# Patient Record
Sex: FEMALE | Race: WHITE | NOT HISPANIC OR LATINO | URBAN - METROPOLITAN AREA
[De-identification: names, ages, dates, MRNs, and addresses within clinical notes are randomized per-mention and may not be internally consistent; named-entity substitution may affect disease eponyms.]

---

## 2020-07-25 ENCOUNTER — TELEPHONE ENCOUNTER (OUTPATIENT)
Dept: URBAN - METROPOLITAN AREA CLINIC 13 | Facility: CLINIC | Age: 30
End: 2020-07-25

## 2020-07-25 RX ORDER — NORETHINDRONE ACETATE AND ETHINYL ESTRADIOL 1; 20 MG/1; UG/1
TAKE AS DIRECTED TABLET ORAL
Qty: 21 | Refills: 0 | OUTPATIENT
Start: 2012-11-14 | End: 2017-04-05

## 2020-07-25 RX ORDER — HYDROCHLOROTHIAZIDE 25 MG/1
TAKE 1 TABLET DAILY TABLET ORAL
Qty: 30 | Refills: 0 | OUTPATIENT
Start: 2012-12-31 | End: 2017-04-05

## 2020-07-25 RX ORDER — HYDROCORTISONE ACETATE 25 MG/1
INSERT 1 SUPP BEDTIME SUPPOSITORY RECTAL
Qty: 1 | Refills: 1 | OUTPATIENT
Start: 2013-02-11 | End: 2017-04-05

## 2020-07-25 RX ORDER — ARIPIPRAZOLE 10 MG/1
TAKE 1/2 TABLET DAILY TABLET ORAL
Qty: 30 | Refills: 0 | OUTPATIENT
Start: 2013-01-13 | End: 2017-04-05

## 2020-07-26 ENCOUNTER — TELEPHONE ENCOUNTER (OUTPATIENT)
Dept: URBAN - METROPOLITAN AREA CLINIC 13 | Facility: CLINIC | Age: 30
End: 2020-07-26

## 2020-07-26 RX ORDER — ETONOGESTREL AND ETHINYL ESTRADIOL .12; .015 MG/D; MG/D
INSERT, EXTENDED RELEASE VAGINAL
Qty: 1 | Refills: 0 | Status: ACTIVE | COMMUNITY
Start: 2011-04-10

## 2020-07-26 RX ORDER — PHENAZOPYRIDINE HYDROCHLORIDE 100 MG/1
TAKE 1 CAPSULE DAILY TABLET, COATED ORAL
Refills: 0 | Status: ACTIVE | COMMUNITY

## 2020-07-26 RX ORDER — AZITHROMYCIN DIHYDRATE 250 MG/1
TAKE AS DIRECTED TABLET, FILM COATED ORAL
Qty: 6 | Refills: 0 | Status: ACTIVE | COMMUNITY
Start: 2011-10-26

## 2020-07-26 RX ORDER — HYDROCORTISONE ACETATE 25 MG/1
INSERT 1 SUPPOSITORY RECTALLY AT BEDTIME AS NEEDED SUPPOSITORY RECTAL
Qty: 1 | Refills: 1 | Status: ACTIVE | COMMUNITY
Start: 2017-05-23

## 2020-07-26 RX ORDER — ETONOGESTREL AND ETHINYL ESTRADIOL .12; .015 MG/D; MG/D
USE UTD INSERT, EXTENDED RELEASE VAGINAL
Qty: 1 | Refills: 0 | Status: ACTIVE | COMMUNITY
Start: 2011-12-12

## 2020-07-26 RX ORDER — ETONOGESTREL AND ETHINYL ESTRADIOL .12; .015 MG/D; MG/D
INSERT 1 RING VAGINALLY FOR 3 WEEKS THEN 1 WEEK OFF INSERT, EXTENDED RELEASE VAGINAL
Refills: 0 | Status: ACTIVE | COMMUNITY

## 2020-07-26 RX ORDER — BUPROPION HYDROCHLORIDE 150 MG/1
TABLET, FILM COATED, EXTENDED RELEASE ORAL
Qty: 30 | Refills: 0 | Status: ACTIVE | COMMUNITY
Start: 2012-11-06

## 2021-08-24 ENCOUNTER — OFFICE VISIT (OUTPATIENT)
Dept: URBAN - METROPOLITAN AREA CLINIC 113 | Facility: CLINIC | Age: 31
End: 2021-08-24
Payer: COMMERCIAL

## 2021-08-24 VITALS
TEMPERATURE: 97.7 F | DIASTOLIC BLOOD PRESSURE: 73 MMHG | HEIGHT: 63 IN | HEART RATE: 62 BPM | BODY MASS INDEX: 26.58 KG/M2 | RESPIRATION RATE: 18 BRPM | WEIGHT: 150 LBS | SYSTOLIC BLOOD PRESSURE: 105 MMHG

## 2021-08-24 DIAGNOSIS — Z86.010 HISTORY OF ADENOMATOUS POLYP OF COLON: ICD-10-CM

## 2021-08-24 DIAGNOSIS — R12 HEARTBURN: ICD-10-CM

## 2021-08-24 DIAGNOSIS — R14.0 ABDOMINAL BLOATING: ICD-10-CM

## 2021-08-24 DIAGNOSIS — R10.13 EPIGASTRIC ABDOMINAL PAIN: ICD-10-CM

## 2021-08-24 PROCEDURE — 99204 OFFICE O/P NEW MOD 45 MIN: CPT | Performed by: INTERNAL MEDICINE

## 2021-08-24 RX ORDER — VALACYCLOVIR HYDROCHLORIDE 500 MG/1
1 TABLET TABLET, FILM COATED ORAL ONCE A DAY
Status: ACTIVE | COMMUNITY

## 2021-08-24 RX ORDER — BUPROPION HYDROCHLORIDE 150 MG/1
TABLET, FILM COATED, EXTENDED RELEASE ORAL
Qty: 30 | Refills: 0 | Status: DISCONTINUED | COMMUNITY
Start: 2012-11-06

## 2021-08-24 RX ORDER — B-COMPLEX WITH VITAMIN C
1 TABLET TABLET ORAL ONCE A DAY
Status: ACTIVE | COMMUNITY

## 2021-08-24 RX ORDER — ETONOGESTREL AND ETHINYL ESTRADIOL .12; .015 MG/D; MG/D
_INSERT 1 RING VAGINALLY FOR 3 WEEKS THEN 1 WEEK OFF INSERT, EXTENDED RELEASE VAGINAL
Refills: 0 | Status: ACTIVE | COMMUNITY

## 2021-08-24 RX ORDER — MULTIVIT-MIN/IRON/FOLIC ACID/K 18-600-40
AS DIRECTED CAPSULE ORAL
Status: ACTIVE | COMMUNITY

## 2021-08-24 RX ORDER — MULTIVIT-MIN/IRON/FOLIC ACID/K 18-600-40
1 CAPSULE CAPSULE ORAL ONCE A DAY
Status: ACTIVE | COMMUNITY

## 2021-08-24 RX ORDER — AZITHROMYCIN DIHYDRATE 250 MG/1
TAKE AS DIRECTED TABLET, FILM COATED ORAL
Qty: 6 | Refills: 0 | Status: DISCONTINUED | COMMUNITY
Start: 2011-10-26

## 2021-08-24 RX ORDER — SODIUM, POTASSIUM,MAG SULFATES 17.5-3.13G
354 ML SOLUTION, RECONSTITUTED, ORAL ORAL ONCE
Qty: 354 MILLILITER | Refills: 0 | OUTPATIENT
Start: 2021-08-24 | End: 2021-08-25

## 2021-08-24 RX ORDER — CLONAZEPAM 1 MG/1
1 TABLET TABLET ORAL ONCE A DAY
Status: ACTIVE | COMMUNITY

## 2021-08-24 RX ORDER — PHENAZOPYRIDINE HYDROCHLORIDE 100 MG/1
TAKE 1 CAPSULE DAILY TABLET, COATED ORAL
Refills: 0 | Status: DISCONTINUED | COMMUNITY

## 2021-08-24 RX ORDER — HYDROCORTISONE ACETATE 25 MG/1
_INSERT 1 SUPPOSITORY RECTALLY AT BEDTIME AS NEEDED SUPPOSITORY RECTAL
Qty: 1 | Refills: 1 | Status: DISCONTINUED | COMMUNITY
Start: 2017-05-23

## 2021-08-24 NOTE — HPI-TODAY'S VISIT:
31-year-old female with a history of complicated external and internal hemorrhoids, serrated adenomas of the colon due surveillance, presenting for evaluation of abdominal bloating.  She reports a 3-6 month history of persistent abdominal bloating, with associated heartburn, indigestion and belching. The symptoms are typically present upon waking, but are worsened after eating- particularly with consumption of dairy products or spicy foods. She has occasional epigastric pain associated with meals, but this is infrequent. She denies nausea or vomiting. She has 1-2 nonbloody stools daily. She has tried omeprazole, digestive enzymes, and probiotics without change. She takes Aleve a few times per month.

## 2021-08-25 LAB — H PYLORI BREATH TEST: NEGATIVE

## 2021-09-16 ENCOUNTER — TELEPHONE ENCOUNTER (OUTPATIENT)
Dept: URBAN - METROPOLITAN AREA CLINIC 113 | Facility: CLINIC | Age: 31
End: 2021-09-16

## 2021-09-17 ENCOUNTER — LAB OUTSIDE AN ENCOUNTER (OUTPATIENT)
Dept: URBAN - METROPOLITAN AREA CLINIC 113 | Facility: CLINIC | Age: 31
End: 2021-09-17

## 2021-09-17 ENCOUNTER — OFFICE VISIT (OUTPATIENT)
Dept: URBAN - METROPOLITAN AREA SURGERY CENTER 25 | Facility: SURGERY CENTER | Age: 31
End: 2021-09-17
Payer: COMMERCIAL

## 2021-09-17 DIAGNOSIS — D12.3 ADENOMA OF TRANSVERSE COLON: ICD-10-CM

## 2021-09-17 DIAGNOSIS — Z86.010 ADENOMAS PERSONAL HISTORY OF COLONIC POLYPS: ICD-10-CM

## 2021-09-17 DIAGNOSIS — D12.0 ADENOMA OF CECUM: ICD-10-CM

## 2021-09-17 PROCEDURE — G8907 PT DOC NO EVENTS ON DISCHARG: HCPCS | Performed by: INTERNAL MEDICINE

## 2021-09-17 PROCEDURE — 45385 COLONOSCOPY W/LESION REMOVAL: CPT | Performed by: INTERNAL MEDICINE

## 2021-09-17 RX ORDER — MULTIVIT-MIN/IRON/FOLIC ACID/K 18-600-40
AS DIRECTED CAPSULE ORAL
Status: ACTIVE | COMMUNITY

## 2021-09-17 RX ORDER — CLONAZEPAM 1 MG/1
1 TABLET TABLET ORAL ONCE A DAY
Status: ACTIVE | COMMUNITY

## 2021-09-17 RX ORDER — B-COMPLEX WITH VITAMIN C
1 TABLET TABLET ORAL ONCE A DAY
Status: ACTIVE | COMMUNITY

## 2021-09-17 RX ORDER — MULTIVIT-MIN/IRON/FOLIC ACID/K 18-600-40
1 CAPSULE CAPSULE ORAL ONCE A DAY
Status: ACTIVE | COMMUNITY

## 2021-09-17 RX ORDER — ETONOGESTREL AND ETHINYL ESTRADIOL .12; .015 MG/D; MG/D
_INSERT 1 RING VAGINALLY FOR 3 WEEKS THEN 1 WEEK OFF INSERT, EXTENDED RELEASE VAGINAL
Refills: 0 | Status: ACTIVE | COMMUNITY

## 2021-09-17 RX ORDER — VALACYCLOVIR HYDROCHLORIDE 500 MG/1
1 TABLET TABLET, FILM COATED ORAL ONCE A DAY
Status: ACTIVE | COMMUNITY

## 2021-09-26 LAB
COMMENT:: (no result)
CPT CODE(S):: (no result)
CPT DISCLAIMER:: (no result)
DIAGNOSIS SYNOPSIS:: (no result)
DIAGNOSIS:: (no result)
ENDOSCOPIC FINDINGS:: (no result)
Lab: (no result)
MICROSCOPIC DESCRIPTION:: (no result)
PATHOLOGIST PROVIDED ICD:: (no result)
SPECIMEN:: (no result)

## 2021-10-01 ENCOUNTER — OFFICE VISIT (OUTPATIENT)
Dept: URBAN - METROPOLITAN AREA CLINIC 113 | Facility: CLINIC | Age: 31
End: 2021-10-01

## 2021-10-08 ENCOUNTER — OFFICE VISIT (OUTPATIENT)
Dept: URBAN - METROPOLITAN AREA CLINIC 113 | Facility: CLINIC | Age: 31
End: 2021-10-08

## 2021-11-05 ENCOUNTER — OFFICE VISIT (OUTPATIENT)
Dept: URBAN - METROPOLITAN AREA CLINIC 107 | Facility: CLINIC | Age: 31
End: 2021-11-05

## 2021-11-05 RX ORDER — CLONAZEPAM 1 MG/1
1 TABLET TABLET ORAL ONCE A DAY
Status: ACTIVE | COMMUNITY

## 2021-11-05 RX ORDER — MULTIVIT-MIN/IRON/FOLIC ACID/K 18-600-40
1 CAPSULE CAPSULE ORAL ONCE A DAY
Status: ACTIVE | COMMUNITY

## 2021-11-05 RX ORDER — ETONOGESTREL AND ETHINYL ESTRADIOL .12; .015 MG/D; MG/D
_INSERT 1 RING VAGINALLY FOR 3 WEEKS THEN 1 WEEK OFF INSERT, EXTENDED RELEASE VAGINAL
Refills: 0 | Status: ACTIVE | COMMUNITY

## 2021-11-05 RX ORDER — MULTIVIT-MIN/IRON/FOLIC ACID/K 18-600-40
AS DIRECTED CAPSULE ORAL
Status: ACTIVE | COMMUNITY

## 2021-11-05 RX ORDER — VALACYCLOVIR HYDROCHLORIDE 500 MG/1
1 TABLET TABLET, FILM COATED ORAL ONCE A DAY
Status: ACTIVE | COMMUNITY

## 2021-11-05 RX ORDER — B-COMPLEX WITH VITAMIN C
1 TABLET TABLET ORAL ONCE A DAY
Status: ACTIVE | COMMUNITY

## 2021-11-05 NOTE — HPI-TODAY'S VISIT:
Ms. Brown is a 31-year-old woman with a history of complicated external and internal hemorrhoids, serrated adenomas of the colon, and anxiety, presenting for follow-up after undergoing colonoscopy. She was last seen on 8/24/2021, referred by Dr. Thrasher, for evaluation regarding excessive gas and bloating.  H. pylori breath test and abdominal ultrasound were ordered at that time as she requested conservative management.  Upper endoscopy was deferred at that time.  She was planned for surveillance colonoscopy given her history of serrated adenomas.  She was to continue FD guard, digestive enzymes and daily probiotic.  Education given regarding low FODMAP diet. Labs (8/24/2021): Negative H. pylori breath test Abdominal ultrasound (8/27/2021): Normal sonographic evaluation of the abdomen. Colonoscopy (9/17/2021): [place] bowel preparation scale score of 9. One 15 mm polyp in the cecum. One 9 mm polyp in the distal transverse colon.  Nonbleeding internal hemorrhoids, grade 1 through 2.  Cecal and transverse colon polypectomies demonstrated sessile serrated adenomas.  Repeat colonoscopy is recommended in 3 years as part of colon cancer prevention.

## 2021-12-03 ENCOUNTER — TELEPHONE ENCOUNTER (OUTPATIENT)
Dept: URBAN - METROPOLITAN AREA CLINIC 113 | Facility: CLINIC | Age: 31
End: 2021-12-03

## 2021-12-03 RX ORDER — HYOSCYAMINE SULFATE 0.12 MG/1
1 TABLET AS NEEDED TABLET ORAL
Qty: 90 | Refills: 2 | OUTPATIENT

## 2021-12-09 ENCOUNTER — OFFICE VISIT (OUTPATIENT)
Dept: URBAN - METROPOLITAN AREA CLINIC 107 | Facility: CLINIC | Age: 31
End: 2021-12-09

## 2022-01-12 ENCOUNTER — OFFICE VISIT (OUTPATIENT)
Dept: URBAN - METROPOLITAN AREA CLINIC 113 | Facility: CLINIC | Age: 32
End: 2022-01-12

## 2022-01-27 ENCOUNTER — OFFICE VISIT (OUTPATIENT)
Dept: URBAN - METROPOLITAN AREA CLINIC 107 | Facility: CLINIC | Age: 32
End: 2022-01-27

## 2022-02-03 ENCOUNTER — OFFICE VISIT (OUTPATIENT)
Dept: URBAN - METROPOLITAN AREA CLINIC 107 | Facility: CLINIC | Age: 32
End: 2022-02-03
Payer: COMMERCIAL

## 2022-02-03 ENCOUNTER — WEB ENCOUNTER (OUTPATIENT)
Dept: URBAN - METROPOLITAN AREA CLINIC 107 | Facility: CLINIC | Age: 32
End: 2022-02-03

## 2022-02-03 VITALS
HEART RATE: 84 BPM | HEIGHT: 63 IN | RESPIRATION RATE: 18 BRPM | DIASTOLIC BLOOD PRESSURE: 67 MMHG | WEIGHT: 144 LBS | BODY MASS INDEX: 25.52 KG/M2 | TEMPERATURE: 98.2 F | SYSTOLIC BLOOD PRESSURE: 103 MMHG

## 2022-02-03 DIAGNOSIS — R10.13 EPIGASTRIC ABDOMINAL PAIN: ICD-10-CM

## 2022-02-03 DIAGNOSIS — K62.5 BRIGHT RED BLOOD PER RECTUM: ICD-10-CM

## 2022-02-03 DIAGNOSIS — Z86.010 HISTORY OF ADENOMATOUS POLYP OF COLON: ICD-10-CM

## 2022-02-03 DIAGNOSIS — R12 HEARTBURN: ICD-10-CM

## 2022-02-03 PROCEDURE — 99214 OFFICE O/P EST MOD 30 MIN: CPT | Performed by: PHYSICIAN ASSISTANT

## 2022-02-03 RX ORDER — FAMOTIDINE 20 MG/1
1 TABLET AT BEDTIME AS NEEDED TABLET, FILM COATED ORAL ONCE A DAY
Qty: 90 | Refills: 2 | OUTPATIENT
Start: 2022-02-03

## 2022-02-03 RX ORDER — MULTIVIT-MIN/IRON/FOLIC ACID/K 18-600-40
AS DIRECTED CAPSULE ORAL
Status: ACTIVE | COMMUNITY

## 2022-02-03 RX ORDER — VALACYCLOVIR HYDROCHLORIDE 500 MG/1
1 TABLET TABLET, FILM COATED ORAL ONCE A DAY
Status: ACTIVE | COMMUNITY

## 2022-02-03 RX ORDER — CLONAZEPAM 1 MG/1
1 TABLET TABLET ORAL ONCE A DAY
Status: ACTIVE | COMMUNITY

## 2022-02-03 RX ORDER — PANTOPRAZOLE SODIUM 40 MG/1
1 TABLET TABLET, DELAYED RELEASE ORAL ONCE A DAY
Qty: 90 | Refills: 2 | OUTPATIENT
Start: 2022-02-03

## 2022-02-03 RX ORDER — B-COMPLEX WITH VITAMIN C
1 TABLET TABLET ORAL ONCE A DAY
Status: ACTIVE | COMMUNITY

## 2022-02-03 RX ORDER — HYOSCYAMINE SULFATE 0.12 MG/1
1 TABLET AS NEEDED TABLET ORAL
Qty: 90 | Refills: 2 | Status: DISCONTINUED | COMMUNITY

## 2022-02-03 RX ORDER — ETONOGESTREL AND ETHINYL ESTRADIOL .12; .015 MG/D; MG/D
_INSERT 1 RING VAGINALLY FOR 3 WEEKS THEN 1 WEEK OFF INSERT, EXTENDED RELEASE VAGINAL
Refills: 0 | Status: ACTIVE | COMMUNITY

## 2022-02-03 RX ORDER — MULTIVIT-MIN/IRON/FOLIC ACID/K 18-600-40
1 CAPSULE CAPSULE ORAL ONCE A DAY
Status: ACTIVE | COMMUNITY

## 2022-02-03 NOTE — PHYSICAL EXAM GASTROINTESTINAL
Abdomen , soft, mild epigastric discomfort with palpation, nondistended , no guarding or rigidity , no masses palpable , normal bowel sounds

## 2022-02-03 NOTE — HPI-TODAY'S VISIT:
Ms. Brown is a 32-year-old woman with history of complicated external and internal hemorrhoid, serrated adenomas of the colon, presenting for evaluation of rectal bleeding.  She was last seen on 8/24/2021 for evaluation of abdominal bloating. SHe also reported complaints of upper abdominal pain. She was planned for abdominal ultrasound and H pylori breath test. EGD was deferred at that time. She was to continue daily probiotic and digestive enzymes and instructed for follow low FODMAP diet. A colonoscopy was planned for adenoma surveillance at that time.  Labs (8/24/2021): negative H pylori breath test.  Colonoscopy (9/17/2021): BBPS 9.  One 15 mm polyp in the cecum. One 9 mm polyp in the distal transverse colon. Non-bleeding internal hemorrhoids grade I-II. Pathology revealed these to be sessile serrated adenomas. A repeat colonoscopy is recommended in 3 years as part of colon cancer prevention.   She tells from the office on 12/3/2021 with complaints of abdominal cramping and rectal bleeding.  She was recommended to undergo bowel flush with mag citrate and daily fiber supplement.  She is also instructed to use Levsin 0.125 mg as needed for exacerbations and abdominal cramping. She presents today in follow-up.  Her symptoms have resolved.  She believes her symptoms were exacerbated by consuming spicy foods.  Her primary complaint today is persistent heartburn with consuming tomato based sauces, garlic and fried foods.  She believes that anxiety may also be playing a role in her symptoms.  Her prior complaints of abdominal bloating have resolved.  She denies any dysphagia or regurgitation.  She experiences an increase in her heartburn symptoms particularly at nighttime after her last meal of the day.  She denies nausea, vomiting, early satiety, abdominal pain, fevers or chills.  She denies any change in her bowel habits.  She is having fairly regular bowel movements daily that are normal consistency.  No red blood per rectum, melena or hematochezia.

## 2022-02-12 PROBLEM — 116289008 ABDOMINAL BLOATING: Status: ACTIVE | Noted: 2022-02-12

## 2022-02-12 PROBLEM — 429047008 HISTORY OF ADENOMATOUS POLYP OF COLON: Status: ACTIVE | Noted: 2021-08-24

## 2022-04-07 ENCOUNTER — OFFICE VISIT (OUTPATIENT)
Dept: URBAN - METROPOLITAN AREA CLINIC 107 | Facility: CLINIC | Age: 32
End: 2022-04-07

## 2022-08-10 ENCOUNTER — OFFICE VISIT (OUTPATIENT)
Dept: URBAN - METROPOLITAN AREA CLINIC 107 | Facility: CLINIC | Age: 32
End: 2022-08-10

## 2022-08-24 ENCOUNTER — OFFICE VISIT (OUTPATIENT)
Dept: URBAN - METROPOLITAN AREA CLINIC 107 | Facility: CLINIC | Age: 32
End: 2022-08-24
Payer: COMMERCIAL

## 2022-08-24 VITALS
HEIGHT: 63 IN | BODY MASS INDEX: 25.52 KG/M2 | HEART RATE: 65 BPM | RESPIRATION RATE: 18 BRPM | SYSTOLIC BLOOD PRESSURE: 103 MMHG | WEIGHT: 144 LBS | DIASTOLIC BLOOD PRESSURE: 68 MMHG | TEMPERATURE: 98.2 F

## 2022-08-24 DIAGNOSIS — R07.89 ATYPICAL CHEST PAIN: ICD-10-CM

## 2022-08-24 DIAGNOSIS — R10.13 EPIGASTRIC ABDOMINAL PAIN: ICD-10-CM

## 2022-08-24 DIAGNOSIS — R12 HEARTBURN: ICD-10-CM

## 2022-08-24 PROCEDURE — 99214 OFFICE O/P EST MOD 30 MIN: CPT | Performed by: NURSE PRACTITIONER

## 2022-08-24 RX ORDER — ETONOGESTREL AND ETHINYL ESTRADIOL .12; .015 MG/D; MG/D
_INSERT 1 RING VAGINALLY FOR 3 WEEKS THEN 1 WEEK OFF INSERT, EXTENDED RELEASE VAGINAL
Refills: 0 | Status: ACTIVE | COMMUNITY

## 2022-08-24 RX ORDER — FAMOTIDINE 20 MG/1
1 TABLET AT BEDTIME AS NEEDED TABLET, FILM COATED ORAL ONCE A DAY
Qty: 90 | Refills: 2 | Status: ACTIVE | COMMUNITY
Start: 2022-02-03

## 2022-08-24 RX ORDER — MULTIVIT-MIN/IRON/FOLIC ACID/K 18-600-40
AS DIRECTED CAPSULE ORAL
Status: ACTIVE | COMMUNITY

## 2022-08-24 RX ORDER — SERTRALINE HYDROCHLORIDE 50 MG/1
1 TABLET TABLET, FILM COATED ORAL ONCE A DAY
Status: ACTIVE | COMMUNITY

## 2022-08-24 RX ORDER — CLONAZEPAM 1 MG/1
1 TABLET TABLET ORAL ONCE A DAY
Status: ACTIVE | COMMUNITY

## 2022-08-24 RX ORDER — PANTOPRAZOLE SODIUM 40 MG/1
1 TABLET TABLET, DELAYED RELEASE ORAL ONCE A DAY
Qty: 90 | Refills: 2 | Status: ACTIVE | COMMUNITY
Start: 2022-02-03

## 2022-08-24 RX ORDER — B-COMPLEX WITH VITAMIN C
1 TABLET TABLET ORAL ONCE A DAY
Status: ACTIVE | COMMUNITY

## 2022-08-24 RX ORDER — VALACYCLOVIR HYDROCHLORIDE 500 MG/1
1 TABLET TABLET, FILM COATED ORAL ONCE A DAY
Status: ACTIVE | COMMUNITY

## 2022-08-24 RX ORDER — MULTIVIT-MIN/IRON/FOLIC ACID/K 18-600-40
1 CAPSULE CAPSULE ORAL ONCE A DAY
Status: ACTIVE | COMMUNITY

## 2022-08-24 NOTE — HPI-OTHER HISTORIES
Colonoscopy 9/17/2021:A 15 mm sessile serrated adenoma in the cecum, a 9 mm sessile serrated adenoma in the distal transverse colon, grade 1-2 nonbleeding internal hemorrhoids.  Repeat colonoscopy recommended in 3 years for surveillance. Abdominal ultrasound 8/27/2021 was normal. H. pylori breath test 8/24/2021 was negative. Labs 7/28/21: CBC, CMP unremarkable. Normal TSH.

## 2022-08-24 NOTE — HPI-TODAY'S VISIT:
32-year-old female with a history of complicated external and internal hemorrhoids, serrated adenomas of the colon due surveillance, presenting for evaluation of abdominal pain.  She was seen in the office in February for follow-up regarding self-limited red blood per rectum, suspected to be hemorrhoidal in origin.  Her symptoms had resolved by the time of the visit.  She was to continue a daily fiber supplement for bowel maintenance.  Regarding epigastric pain and heartburn, she was recommended a trial of pantoprazole 40 mg in the morning and famotidine 20 mg prior to bedtime with consideration for upper endoscopy pending clinical course. She admits to being inconsistent with acid suppressive therapy following the last visit. Within the last several months, she has experienced constant burning mid chest pain which is worsened following meals. The discomfort is not related to activity and she denies associated shortness of breath. She complains of epigastric discomfort, heartburn, indigestion, and excess belching, particularly with consumption of spicy foods. She has been taking the pantoprazole and famotidine daily for about a month with little relief. On occasion, she will experience a significant exacerbation of her symptoms in the middle of the night, requiring Tums, Pepto Bismol, and Klonopin for the episode to pass. She feels her anxiety is playing a big role. She was just restarted on Zoloft. Both Cymbalta and BuSpar have been ineffective in the past. She avoids NSAIDs.

## 2022-08-25 ENCOUNTER — TELEPHONE ENCOUNTER (OUTPATIENT)
Dept: URBAN - METROPOLITAN AREA CLINIC 113 | Facility: CLINIC | Age: 32
End: 2022-08-25

## 2022-08-25 ENCOUNTER — LAB OUTSIDE AN ENCOUNTER (OUTPATIENT)
Dept: URBAN - METROPOLITAN AREA CLINIC 107 | Facility: CLINIC | Age: 32
End: 2022-08-25

## 2022-08-25 PROBLEM — 16331000 HEARTBURN: Status: ACTIVE | Noted: 2022-02-12

## 2022-08-25 PROBLEM — 79922009 EPIGASTRIC PAIN: Status: ACTIVE | Noted: 2022-02-12

## 2022-09-28 ENCOUNTER — OFFICE VISIT (OUTPATIENT)
Dept: URBAN - METROPOLITAN AREA CLINIC 107 | Facility: CLINIC | Age: 32
End: 2022-09-28

## 2022-11-04 ENCOUNTER — OFFICE VISIT (OUTPATIENT)
Dept: URBAN - METROPOLITAN AREA SURGERY CENTER 25 | Facility: SURGERY CENTER | Age: 32
End: 2022-11-04

## 2022-11-07 ENCOUNTER — OFFICE VISIT (OUTPATIENT)
Dept: URBAN - METROPOLITAN AREA CLINIC 112 | Facility: CLINIC | Age: 32
End: 2022-11-07

## 2022-11-23 ENCOUNTER — OFFICE VISIT (OUTPATIENT)
Dept: URBAN - METROPOLITAN AREA CLINIC 107 | Facility: CLINIC | Age: 32
End: 2022-11-23

## 2023-12-05 ENCOUNTER — TELEPHONE ENCOUNTER (OUTPATIENT)
Dept: URBAN - METROPOLITAN AREA CLINIC 107 | Facility: CLINIC | Age: 33
End: 2023-12-05

## 2023-12-05 RX ORDER — HYDROCORTISONE ACETATE 25 MG/1
1 SUPPOSITORY SUPPOSITORY RECTAL ONCE A DAY
Qty: 30 | OUTPATIENT
Start: 2023-12-14 | End: 2024-01-13

## 2023-12-15 ENCOUNTER — CLAIMS CREATED FROM THE CLAIM WINDOW (OUTPATIENT)
Dept: URBAN - METROPOLITAN AREA CLINIC 113 | Facility: CLINIC | Age: 33
End: 2023-12-15
Payer: COMMERCIAL

## 2023-12-15 ENCOUNTER — TELEPHONE ENCOUNTER (OUTPATIENT)
Dept: URBAN - METROPOLITAN AREA CLINIC 113 | Facility: CLINIC | Age: 33
End: 2023-12-15

## 2023-12-15 VITALS
DIASTOLIC BLOOD PRESSURE: 72 MMHG | TEMPERATURE: 97.3 F | SYSTOLIC BLOOD PRESSURE: 123 MMHG | WEIGHT: 147 LBS | RESPIRATION RATE: 18 BRPM | HEIGHT: 63 IN | BODY MASS INDEX: 26.05 KG/M2 | HEART RATE: 74 BPM

## 2023-12-15 DIAGNOSIS — K64.8 HEMORRHOIDS, INTERNAL: ICD-10-CM

## 2023-12-15 DIAGNOSIS — Z86.010 HX OF COLONIC POLYPS: ICD-10-CM

## 2023-12-15 PROCEDURE — 99214 OFFICE O/P EST MOD 30 MIN: CPT

## 2023-12-15 RX ORDER — B-COMPLEX WITH VITAMIN C
1 TABLET TABLET ORAL ONCE A DAY
Status: ACTIVE | COMMUNITY

## 2023-12-15 RX ORDER — ETONOGESTREL AND ETHINYL ESTRADIOL .12; .015 MG/D; MG/D
_INSERT 1 RING VAGINALLY FOR 3 WEEKS THEN 1 WEEK OFF INSERT, EXTENDED RELEASE VAGINAL
Refills: 0 | Status: ACTIVE | COMMUNITY

## 2023-12-15 RX ORDER — HYDROCORTISONE ACETATE 25 MG/1
1 SUPPOSITORY SUPPOSITORY RECTAL ONCE A DAY
Qty: 7 | Refills: 2 | OUTPATIENT
Start: 2023-12-15 | End: 2024-01-05

## 2023-12-15 RX ORDER — MULTIVIT-MIN/IRON/FOLIC ACID/K 18-600-40
AS DIRECTED CAPSULE ORAL
Status: ACTIVE | COMMUNITY

## 2023-12-15 RX ORDER — HYDROCORTISONE ACETATE 25 MG/1
1 SUPPOSITORY SUPPOSITORY RECTAL ONCE A DAY
Qty: 30 | Status: ACTIVE | COMMUNITY
Start: 2023-12-14 | End: 2024-01-13

## 2023-12-15 RX ORDER — CLONAZEPAM 1 MG/1
1 TABLET TABLET ORAL ONCE A DAY
Status: ACTIVE | COMMUNITY

## 2023-12-15 RX ORDER — MULTIVIT-MIN/IRON/FOLIC ACID/K 18-600-40
1 CAPSULE CAPSULE ORAL ONCE A DAY
Status: ACTIVE | COMMUNITY

## 2023-12-15 RX ORDER — VALACYCLOVIR HYDROCHLORIDE 500 MG/1
1 TABLET TABLET, FILM COATED ORAL ONCE A DAY
Status: ACTIVE | COMMUNITY

## 2023-12-17 ENCOUNTER — TELEPHONE ENCOUNTER (OUTPATIENT)
Dept: URBAN - METROPOLITAN AREA CLINIC 113 | Facility: CLINIC | Age: 33
End: 2023-12-17

## 2023-12-17 PROBLEM — 428283002: Status: ACTIVE | Noted: 2023-12-17

## 2024-01-24 ENCOUNTER — OFFICE VISIT (OUTPATIENT)
Dept: URBAN - METROPOLITAN AREA CLINIC 113 | Facility: CLINIC | Age: 34
End: 2024-01-24

## 2024-02-29 ENCOUNTER — HEM (OUTPATIENT)
Dept: URBAN - METROPOLITAN AREA CLINIC 113 | Facility: CLINIC | Age: 34
End: 2024-02-29

## 2024-02-29 VITALS
BODY MASS INDEX: 24.8 KG/M2 | SYSTOLIC BLOOD PRESSURE: 101 MMHG | WEIGHT: 140 LBS | DIASTOLIC BLOOD PRESSURE: 65 MMHG | HEIGHT: 63 IN | HEART RATE: 71 BPM | TEMPERATURE: 97.3 F | RESPIRATION RATE: 16 BRPM

## 2024-02-29 RX ORDER — ETONOGESTREL AND ETHINYL ESTRADIOL .12; .015 MG/D; MG/D
_INSERT 1 RING VAGINALLY FOR 3 WEEKS THEN 1 WEEK OFF INSERT, EXTENDED RELEASE VAGINAL
Refills: 0 | Status: ACTIVE | COMMUNITY

## 2024-02-29 RX ORDER — CLONAZEPAM 1 MG/1
1 TABLET TABLET ORAL ONCE A DAY
Status: ACTIVE | COMMUNITY

## 2024-02-29 RX ORDER — MULTIVIT-MIN/IRON/FOLIC ACID/K 18-600-40
1 CAPSULE CAPSULE ORAL ONCE A DAY
Status: ACTIVE | COMMUNITY

## 2024-02-29 RX ORDER — VALACYCLOVIR HYDROCHLORIDE 500 MG/1
1 TABLET TABLET, FILM COATED ORAL ONCE A DAY
Status: ACTIVE | COMMUNITY

## 2024-02-29 RX ORDER — SERTRALINE HYDROCHLORIDE 50 MG/1
1 TABLET TABLET, FILM COATED ORAL ONCE A DAY
Status: ACTIVE | COMMUNITY

## 2024-02-29 RX ORDER — MULTIVIT-MIN/IRON/FOLIC ACID/K 18-600-40
AS DIRECTED CAPSULE ORAL
Status: ACTIVE | COMMUNITY

## 2024-02-29 RX ORDER — PANTOPRAZOLE SODIUM 40 MG/1
1 TABLET IN THE MORNING BEFORE BREAKFAST TABLET, DELAYED RELEASE ORAL ONCE A DAY
Qty: 30 TABLET | Refills: 3 | Status: ACTIVE | COMMUNITY
Start: 2024-02-13

## 2024-02-29 RX ORDER — FAMOTIDINE 40 MG/1
1 TABLET AT BEDTIME TABLET, FILM COATED ORAL ONCE A DAY
Qty: 30 | Refills: 3 | Status: ACTIVE | COMMUNITY
Start: 2024-02-13

## 2024-02-29 RX ORDER — B-COMPLEX WITH VITAMIN C
1 TABLET TABLET ORAL ONCE A DAY
Status: ACTIVE | COMMUNITY

## 2024-02-29 NOTE — HPI-TODAY'S VISIT:
Ms. Brown is a 33-year-old woman with a history of complicated external and internal hemorrhoids, serrated adenomas of the colon due surveillance (2024), presenting for evaluation of atypical chest pain

## 2024-03-06 PROBLEM — 102589003: Status: ACTIVE | Noted: 2024-03-06

## 2024-03-06 PROBLEM — 721704005: Status: ACTIVE | Noted: 2024-03-06

## 2024-03-06 PROBLEM — 235595009: Status: ACTIVE | Noted: 2024-03-06

## 2024-03-27 ENCOUNTER — HEM (OUTPATIENT)
Dept: URBAN - METROPOLITAN AREA CLINIC 107 | Facility: CLINIC | Age: 34
End: 2024-03-27

## 2024-03-27 VITALS
HEIGHT: 63 IN | SYSTOLIC BLOOD PRESSURE: 94 MMHG | DIASTOLIC BLOOD PRESSURE: 67 MMHG | HEART RATE: 78 BPM | WEIGHT: 135 LBS | TEMPERATURE: 98.1 F | BODY MASS INDEX: 23.92 KG/M2

## 2024-03-27 RX ORDER — VALACYCLOVIR HYDROCHLORIDE 500 MG/1
1 TABLET TABLET, FILM COATED ORAL ONCE A DAY
Status: ACTIVE | COMMUNITY

## 2024-03-27 RX ORDER — FAMOTIDINE 40 MG/1
1 TABLET AT BEDTIME TABLET, FILM COATED ORAL ONCE A DAY
Qty: 30 | Refills: 3 | Status: ACTIVE | COMMUNITY
Start: 2024-02-13

## 2024-03-27 RX ORDER — CLONAZEPAM 1 MG/1
1 TABLET TABLET ORAL ONCE A DAY
Status: ACTIVE | COMMUNITY

## 2024-03-27 RX ORDER — MULTIVIT-MIN/IRON/FOLIC ACID/K 18-600-40
AS DIRECTED CAPSULE ORAL
Status: ACTIVE | COMMUNITY

## 2024-03-27 RX ORDER — OMEPRAZOLE 40 MG/1
1 CAPSULE 30 MINUTES BEFORE MORNING MEAL CAPSULE, DELAYED RELEASE ORAL ONCE A DAY
Qty: 30 | Refills: 3 | Status: ON HOLD | COMMUNITY
Start: 2024-03-06

## 2024-03-27 RX ORDER — SERTRALINE HYDROCHLORIDE 50 MG/1
1 TABLET TABLET, FILM COATED ORAL ONCE A DAY
Status: ACTIVE | COMMUNITY

## 2024-03-27 RX ORDER — B-COMPLEX WITH VITAMIN C
1 TABLET TABLET ORAL ONCE A DAY
Status: ACTIVE | COMMUNITY

## 2024-03-27 RX ORDER — ETONOGESTREL AND ETHINYL ESTRADIOL .12; .015 MG/D; MG/D
_INSERT 1 RING VAGINALLY FOR 3 WEEKS THEN 1 WEEK OFF INSERT, EXTENDED RELEASE VAGINAL
Refills: 0 | Status: ACTIVE | COMMUNITY

## 2024-03-27 RX ORDER — MULTIVIT-MIN/IRON/FOLIC ACID/K 18-600-40
1 CAPSULE CAPSULE ORAL ONCE A DAY
Status: ACTIVE | COMMUNITY

## 2024-03-28 ENCOUNTER — HEM (OUTPATIENT)
Dept: URBAN - METROPOLITAN AREA CLINIC 113 | Facility: CLINIC | Age: 34
End: 2024-03-28

## 2024-05-01 ENCOUNTER — OFFICE VISIT (OUTPATIENT)
Dept: URBAN - METROPOLITAN AREA SURGERY CENTER 25 | Facility: SURGERY CENTER | Age: 34
End: 2024-05-01

## 2024-05-08 ENCOUNTER — TELEPHONE ENCOUNTER (OUTPATIENT)
Dept: URBAN - METROPOLITAN AREA CLINIC 113 | Facility: CLINIC | Age: 34
End: 2024-05-08

## 2024-05-08 RX ORDER — HYDROCORTISONE ACETATE 25 MG/1
1 SUPPOSITORY SUPPOSITORY RECTAL ONCE A DAY
Qty: 30 | Refills: 1 | OUTPATIENT
Start: 2024-05-08 | End: 2024-07-07

## 2024-05-14 ENCOUNTER — OFFICE VISIT (OUTPATIENT)
Dept: URBAN - METROPOLITAN AREA CLINIC 113 | Facility: CLINIC | Age: 34
End: 2024-05-14

## 2024-06-25 ENCOUNTER — OFFICE VISIT (OUTPATIENT)
Dept: URBAN - METROPOLITAN AREA SURGERY CENTER 25 | Facility: SURGERY CENTER | Age: 34
End: 2024-06-25

## 2024-07-31 ENCOUNTER — WEB ENCOUNTER (OUTPATIENT)
Dept: URBAN - METROPOLITAN AREA SURGERY CENTER 25 | Facility: SURGERY CENTER | Age: 34
End: 2024-07-31

## 2024-08-02 ENCOUNTER — CLAIMS CREATED FROM THE CLAIM WINDOW (OUTPATIENT)
Dept: URBAN - METROPOLITAN AREA SURGERY CENTER 25 | Facility: SURGERY CENTER | Age: 34
End: 2024-08-02
Payer: COMMERCIAL

## 2024-08-02 ENCOUNTER — TELEPHONE ENCOUNTER (OUTPATIENT)
Dept: URBAN - METROPOLITAN AREA CLINIC 113 | Facility: CLINIC | Age: 34
End: 2024-08-02

## 2024-08-02 ENCOUNTER — CLAIMS CREATED FROM THE CLAIM WINDOW (OUTPATIENT)
Dept: URBAN - METROPOLITAN AREA SURGERY CENTER 25 | Facility: SURGERY CENTER | Age: 34
End: 2024-08-02

## 2024-08-02 ENCOUNTER — CLAIMS CREATED FROM THE CLAIM WINDOW (OUTPATIENT)
Dept: URBAN - METROPOLITAN AREA CLINIC 4 | Facility: CLINIC | Age: 34
End: 2024-08-02
Payer: COMMERCIAL

## 2024-08-02 DIAGNOSIS — K31.89 OTHER DISEASES OF STOMACH AND DUODENUM: ICD-10-CM

## 2024-08-02 DIAGNOSIS — R10.13 DYSPEPSIA: ICD-10-CM

## 2024-08-02 DIAGNOSIS — K21.9 GASTROESOPHAGEAL REFLUX DISEASE, UNSPECIFIED WHETHER ESOPHAGITIS PRESENT: ICD-10-CM

## 2024-08-02 DIAGNOSIS — R10.13 EPIGASTRIC PAIN: ICD-10-CM

## 2024-08-02 DIAGNOSIS — K21.9 GASTRO-ESOPHAGEAL REFLUX DISEASE WITHOUT ESOPHAGITIS: ICD-10-CM

## 2024-08-02 DIAGNOSIS — K22.9 IRREGULAR Z LINE OF ESOPHAGUS: ICD-10-CM

## 2024-08-02 PROCEDURE — 43239 EGD BIOPSY SINGLE/MULTIPLE: CPT | Performed by: INTERNAL MEDICINE

## 2024-08-02 PROCEDURE — 88305 TISSUE EXAM BY PATHOLOGIST: CPT | Performed by: PATHOLOGY

## 2024-08-02 PROCEDURE — 00731 ANES UPR GI NDSC PX NOS: CPT | Performed by: NURSE ANESTHETIST, CERTIFIED REGISTERED

## 2024-08-02 PROCEDURE — 00731 ANES UPR GI NDSC PX NOS: CPT | Performed by: ANESTHESIOLOGY

## 2024-08-02 RX ORDER — OMEPRAZOLE 40 MG/1
1 CAPSULE 30 MINUTES BEFORE MORNING MEAL CAPSULE, DELAYED RELEASE ORAL ONCE A DAY
Qty: 30 | Refills: 3 | Status: ON HOLD | COMMUNITY
Start: 2024-03-06

## 2024-08-02 RX ORDER — MULTIVIT-MIN/IRON/FOLIC ACID/K 18-600-40
AS DIRECTED CAPSULE ORAL
Status: ACTIVE | COMMUNITY

## 2024-08-02 RX ORDER — CLONAZEPAM 1 MG/1
1 TABLET TABLET ORAL ONCE A DAY
Status: ACTIVE | COMMUNITY

## 2024-08-02 RX ORDER — ETONOGESTREL AND ETHINYL ESTRADIOL .12; .015 MG/D; MG/D
_INSERT 1 RING VAGINALLY FOR 3 WEEKS THEN 1 WEEK OFF INSERT, EXTENDED RELEASE VAGINAL
Refills: 0 | Status: ACTIVE | COMMUNITY

## 2024-08-02 RX ORDER — FAMOTIDINE 40 MG/1
1 TABLET AT BEDTIME TABLET, FILM COATED ORAL ONCE A DAY
Qty: 30 | Refills: 3 | Status: ACTIVE | COMMUNITY
Start: 2024-02-13

## 2024-08-02 RX ORDER — SERTRALINE HYDROCHLORIDE 50 MG/1
1 TABLET TABLET, FILM COATED ORAL ONCE A DAY
Status: ACTIVE | COMMUNITY

## 2024-08-02 RX ORDER — B-COMPLEX WITH VITAMIN C
1 TABLET TABLET ORAL ONCE A DAY
Status: ACTIVE | COMMUNITY

## 2024-08-02 RX ORDER — VALACYCLOVIR HYDROCHLORIDE 500 MG/1
1 TABLET TABLET, FILM COATED ORAL ONCE A DAY
Status: ACTIVE | COMMUNITY

## 2024-08-02 RX ORDER — MULTIVIT-MIN/IRON/FOLIC ACID/K 18-600-40
1 CAPSULE CAPSULE ORAL ONCE A DAY
Status: ACTIVE | COMMUNITY

## 2024-09-05 ENCOUNTER — OFFICE VISIT (OUTPATIENT)
Dept: URBAN - METROPOLITAN AREA CLINIC 107 | Facility: CLINIC | Age: 34
End: 2024-09-05

## 2024-09-13 ENCOUNTER — OFFICE VISIT (OUTPATIENT)
Dept: URBAN - METROPOLITAN AREA CLINIC 113 | Facility: CLINIC | Age: 34
End: 2024-09-13

## 2024-09-19 ENCOUNTER — DASHBOARD ENCOUNTERS (OUTPATIENT)
Age: 34
End: 2024-09-19

## 2024-09-19 ENCOUNTER — OFFICE VISIT (OUTPATIENT)
Dept: URBAN - METROPOLITAN AREA CLINIC 107 | Facility: CLINIC | Age: 34
End: 2024-09-19
Payer: COMMERCIAL

## 2024-09-19 ENCOUNTER — LAB OUTSIDE AN ENCOUNTER (OUTPATIENT)
Dept: URBAN - METROPOLITAN AREA CLINIC 107 | Facility: CLINIC | Age: 34
End: 2024-09-19

## 2024-09-19 VITALS
WEIGHT: 148 LBS | DIASTOLIC BLOOD PRESSURE: 65 MMHG | BODY MASS INDEX: 26.22 KG/M2 | SYSTOLIC BLOOD PRESSURE: 106 MMHG | OXYGEN SATURATION: 96 % | TEMPERATURE: 98.1 F | HEIGHT: 63 IN | HEART RATE: 86 BPM | RESPIRATION RATE: 18 BRPM

## 2024-09-19 DIAGNOSIS — Z86.010 HX OF COLONIC POLYPS: ICD-10-CM

## 2024-09-19 DIAGNOSIS — K21.9 GASTROESOPHAGEAL REFLUX DISEASE WITHOUT ESOPHAGITIS: ICD-10-CM

## 2024-09-19 DIAGNOSIS — K64.8 HEMORRHOIDS, INTERNAL: ICD-10-CM

## 2024-09-19 PROBLEM — 266435005: Status: ACTIVE | Noted: 2024-09-19

## 2024-09-19 PROCEDURE — 99214 OFFICE O/P EST MOD 30 MIN: CPT

## 2024-09-19 RX ORDER — B-COMPLEX WITH VITAMIN C
1 TABLET TABLET ORAL ONCE A DAY
Status: ACTIVE | COMMUNITY

## 2024-09-19 RX ORDER — HYDROCORTISONE ACETATE 25 MG/1
1 SUPPOSITORY SUPPOSITORY RECTAL ONCE A DAY
Qty: 14 | Refills: 3 | OUTPATIENT
Start: 2024-09-19 | End: 2024-11-13

## 2024-09-19 RX ORDER — ETONOGESTREL AND ETHINYL ESTRADIOL .12; .015 MG/D; MG/D
_INSERT 1 RING VAGINALLY FOR 3 WEEKS THEN 1 WEEK OFF INSERT, EXTENDED RELEASE VAGINAL
Refills: 0 | Status: ACTIVE | COMMUNITY

## 2024-09-19 RX ORDER — CLONAZEPAM 1 MG/1
1 TABLET TABLET ORAL ONCE A DAY
Status: ACTIVE | COMMUNITY

## 2024-09-19 RX ORDER — SERTRALINE HYDROCHLORIDE 50 MG/1
1 TABLET TABLET, FILM COATED ORAL ONCE A DAY
Status: ACTIVE | COMMUNITY

## 2024-09-19 RX ORDER — MULTIVIT-MIN/IRON/FOLIC ACID/K 18-600-40
AS DIRECTED CAPSULE ORAL
Status: ACTIVE | COMMUNITY

## 2024-09-19 RX ORDER — FAMOTIDINE 20 MG/1
TAKE 1 TABLET TABLET, FILM COATED ORAL TWICE DAILY
Qty: 180 | Refills: 3 | OUTPATIENT
Start: 2024-09-19

## 2024-09-19 RX ORDER — MULTIVIT-MIN/IRON/FOLIC ACID/K 18-600-40
1 CAPSULE CAPSULE ORAL ONCE A DAY
Status: ACTIVE | COMMUNITY

## 2024-09-19 RX ORDER — VALACYCLOVIR HYDROCHLORIDE 500 MG/1
1 TABLET TABLET, FILM COATED ORAL ONCE A DAY
Status: ACTIVE | COMMUNITY

## 2024-09-19 NOTE — HPI-TODAY'S VISIT:
Ms. Brown is a 34-year-old woman with a history of complicated external/internal hemorrhoids, serrated adenomas of the colon presenting for follow-up after EGD.  She was last seen in office on 3/27/2024 for follow-up regarding symptomatic internal hemorrhoids status post band ligation of the left lateral internal hemorrhoid column on 2/29/2024 and grade 2 right anterior internal hemorrhoid column at that visit.  Patient tolerated the procedure well.  Her EGD performed 8/2/2024 showed irregular Z-line with hyperemia consistent with GERD.  Nonerosive gastropathy.  Biopsied to rule H. pylori.  Normal duodenum.  Biopsied (D1-3).  Pathology revealed no skin abnormality biopsies. She is due for surveillance colonoscopy.  Last colonoscopy performed 9/17/2021 with excellent bowel prep showed a 15 mm polyp in the cecum.  One 5 mm polyp in the distal transverse colon.  Nonbleeding terminal hemorrhoids, grade 1-2.  Pathology revealed sessile serrated adenomas.  Is recommended that colonoscopy be repeated in 3 years.  Today she reports increase in belching and heartburn. She tries to avoid dietary triggers. Famotidine seemed to work best for her symptoms. She denies abdominal pain, nausea, vomiting, or fevers. Her weight has remained stable since last office visit. Her internal hemorrhoid, she experienced severe pain with hemorrhoid banding, she would like to hold off on referral to colorectal surgery due to the possibility of pregnancy in the future. Her bowels move overall at baseline. Denies hematochezia or melena.

## 2024-11-01 ENCOUNTER — CLAIMS CREATED FROM THE CLAIM WINDOW (OUTPATIENT)
Dept: URBAN - METROPOLITAN AREA SURGERY CENTER 25 | Facility: SURGERY CENTER | Age: 34
End: 2024-11-01
Payer: COMMERCIAL

## 2024-11-01 ENCOUNTER — CLAIMS CREATED FROM THE CLAIM WINDOW (OUTPATIENT)
Dept: URBAN - METROPOLITAN AREA CLINIC 4 | Facility: CLINIC | Age: 34
End: 2024-11-01
Payer: COMMERCIAL

## 2024-11-01 DIAGNOSIS — Z12.11 COLON CANCER SCREENING (HIGH RISK): ICD-10-CM

## 2024-11-01 DIAGNOSIS — K64.0 FIRST DEGREE HEMORRHOIDS: ICD-10-CM

## 2024-11-01 DIAGNOSIS — Z86.0101 H/O ADENOMATOUS POLYP OF COLON: ICD-10-CM

## 2024-11-01 DIAGNOSIS — K63.5 BENIGN COLON POLYPS: ICD-10-CM

## 2024-11-01 DIAGNOSIS — D12.3 BENIGN NEOPLASM OF TRANSVERSE COLON: ICD-10-CM

## 2024-11-01 DIAGNOSIS — D12.3 ADENOMA OF TRANSVERSE COLON: ICD-10-CM

## 2024-11-01 DIAGNOSIS — Z86.0100 PERSONAL HISTORY OF COLONIC POLYPS: ICD-10-CM

## 2024-11-01 DIAGNOSIS — Z86.0100 PERSONAL HISTORY OF COLON POLYPS, UNSPECIFIED: ICD-10-CM

## 2024-11-01 DIAGNOSIS — D12.0 ADENOMA OF CECUM: ICD-10-CM

## 2024-11-01 DIAGNOSIS — D12.0 BENIGN NEOPLASM OF CECUM: ICD-10-CM

## 2024-11-01 PROCEDURE — 88305 TISSUE EXAM BY PATHOLOGIST: CPT | Performed by: PATHOLOGY

## 2024-11-01 PROCEDURE — 45385 COLONOSCOPY W/LESION REMOVAL: CPT | Performed by: INTERNAL MEDICINE

## 2024-11-01 PROCEDURE — 00812 ANES LWR INTST SCR COLSC: CPT | Performed by: ANESTHESIOLOGY

## 2024-11-01 PROCEDURE — 00812 ANES LWR INTST SCR COLSC: CPT | Performed by: ANESTHESIOLOGIST ASSISTANT

## 2024-11-01 PROCEDURE — 0529F INTRVL 3/>YR PTS CLNSCP DOCD: CPT | Performed by: INTERNAL MEDICINE

## 2024-11-01 RX ORDER — VALACYCLOVIR HYDROCHLORIDE 500 MG/1
1 TABLET TABLET, FILM COATED ORAL ONCE A DAY
Status: ACTIVE | COMMUNITY

## 2024-11-01 RX ORDER — FAMOTIDINE 20 MG/1
TAKE 1 TABLET TABLET, FILM COATED ORAL TWICE DAILY
Qty: 180 | Refills: 3 | Status: ACTIVE | COMMUNITY
Start: 2024-09-19

## 2024-11-01 RX ORDER — SERTRALINE HYDROCHLORIDE 50 MG/1
1 TABLET TABLET, FILM COATED ORAL ONCE A DAY
Status: ACTIVE | COMMUNITY

## 2024-11-01 RX ORDER — MULTIVIT-MIN/IRON/FOLIC ACID/K 18-600-40
AS DIRECTED CAPSULE ORAL
Status: ACTIVE | COMMUNITY

## 2024-11-01 RX ORDER — HYDROCORTISONE ACETATE 25 MG/1
1 SUPPOSITORY SUPPOSITORY RECTAL ONCE A DAY
Qty: 14 | Refills: 3 | Status: ACTIVE | COMMUNITY
Start: 2024-09-19 | End: 2024-11-13

## 2024-11-01 RX ORDER — CLONAZEPAM 1 MG/1
1 TABLET TABLET ORAL ONCE A DAY
Status: ACTIVE | COMMUNITY

## 2024-11-01 RX ORDER — ETONOGESTREL AND ETHINYL ESTRADIOL .12; .015 MG/D; MG/D
_INSERT 1 RING VAGINALLY FOR 3 WEEKS THEN 1 WEEK OFF INSERT, EXTENDED RELEASE VAGINAL
Refills: 0 | Status: ACTIVE | COMMUNITY

## 2024-11-01 RX ORDER — MULTIVIT-MIN/IRON/FOLIC ACID/K 18-600-40
1 CAPSULE CAPSULE ORAL ONCE A DAY
Status: ACTIVE | COMMUNITY

## 2024-11-01 RX ORDER — B-COMPLEX WITH VITAMIN C
1 TABLET TABLET ORAL ONCE A DAY
Status: ACTIVE | COMMUNITY

## 2024-11-21 ENCOUNTER — OFFICE VISIT (OUTPATIENT)
Dept: URBAN - METROPOLITAN AREA CLINIC 107 | Facility: CLINIC | Age: 34
End: 2024-11-21
Payer: COMMERCIAL

## 2024-11-21 VITALS
DIASTOLIC BLOOD PRESSURE: 70 MMHG | SYSTOLIC BLOOD PRESSURE: 103 MMHG | BODY MASS INDEX: 26.12 KG/M2 | TEMPERATURE: 97.7 F | HEART RATE: 67 BPM | HEIGHT: 63 IN | WEIGHT: 147.4 LBS

## 2024-11-21 DIAGNOSIS — K21.9 GASTROESOPHAGEAL REFLUX DISEASE WITHOUT ESOPHAGITIS: ICD-10-CM

## 2024-11-21 DIAGNOSIS — K64.8 HEMORRHOIDS, INTERNAL: ICD-10-CM

## 2024-11-21 DIAGNOSIS — Z12.11 COLON CANCER SCREENING: ICD-10-CM

## 2024-11-21 DIAGNOSIS — Z86.0101 HISTORY OF ADENOMATOUS POLYP OF COLON: ICD-10-CM

## 2024-11-21 PROCEDURE — 99213 OFFICE O/P EST LOW 20 MIN: CPT

## 2024-11-21 RX ORDER — MULTIVIT-MIN/IRON/FOLIC ACID/K 18-600-40
1 CAPSULE CAPSULE ORAL ONCE A DAY
Status: ACTIVE | COMMUNITY

## 2024-11-21 RX ORDER — ETONOGESTREL AND ETHINYL ESTRADIOL .12; .015 MG/D; MG/D
_INSERT 1 RING VAGINALLY FOR 3 WEEKS THEN 1 WEEK OFF INSERT, EXTENDED RELEASE VAGINAL
Refills: 0 | Status: ACTIVE | COMMUNITY

## 2024-11-21 RX ORDER — SERTRALINE HYDROCHLORIDE 50 MG/1
1 TABLET TABLET, FILM COATED ORAL ONCE A DAY
Status: ACTIVE | COMMUNITY

## 2024-11-21 RX ORDER — VALACYCLOVIR HYDROCHLORIDE 500 MG/1
1 TABLET TABLET, FILM COATED ORAL ONCE A DAY
Status: ACTIVE | COMMUNITY

## 2024-11-21 RX ORDER — MULTIVIT-MIN/IRON/FOLIC ACID/K 18-600-40
AS DIRECTED CAPSULE ORAL
Status: ACTIVE | COMMUNITY

## 2024-11-21 RX ORDER — CLONAZEPAM 1 MG/1
1 TABLET TABLET ORAL ONCE A DAY
Status: ACTIVE | COMMUNITY

## 2024-11-21 RX ORDER — B-COMPLEX WITH VITAMIN C
1 TABLET TABLET ORAL ONCE A DAY
Status: ACTIVE | COMMUNITY

## 2024-11-21 NOTE — HPI-TODAY'S VISIT:
Ms. Brown is a 34-year-old woman with a history of complicated external/internal hemorrhoids, serrated adenomas of the colon presenting for follow-up after colonoscopy and to discuss options for her acid reflux.  She was last in office on 9/19/2024 for follow-up after EGD which showed esophageal changes consistent with GERD, without precancer changes or signs of infection.  She was to restart famotidine 4 g once in the morning and at bedtime as needed.  She is also given samples of requesting a 10 mg should famotidine become ineffective.  Her internal hemorrhoids status post band ligation, patient experienced pain and felt this was unsuccessful she will continue Anusol suppositories as needed along with sitz bath's, Tucks pads.  Her history of advanced colon adenoma she was due for colon polyp surveillance and this was scheduled at her convenience.  Her colonoscopy performed on 11/1/2024 with excellent bowel prep showed 2 small (4 to 6 mm) polyps in the cecum and in the transverse colon.  Internal nonbleeding hemorrhoids, grade 1.  Pathology revealed sessile serrated adenomas.  A colonoscopy is recommended in 5 years for surveillance.  Today she reports that she is having an increase in belching and indigestion that occurs daily.  She would like to consider dietary modifications and OTC supplements for the symptoms as she has concerns with utilizing PPIs for a long term use.  She also reports that with her bowel purge for colonoscopy she had the severe nausea which resulted in vomiting and would like to have the Zofran or other antiemetic for the next colonoscopy.  She also was concerned after upper GI endoscopy and her colonoscopy she experienced sneezing for approximately 4 days after.

## 2025-08-06 ENCOUNTER — TELEPHONE ENCOUNTER (OUTPATIENT)
Dept: URBAN - METROPOLITAN AREA CLINIC 113 | Facility: CLINIC | Age: 35
End: 2025-08-06